# Patient Record
Sex: MALE | Race: WHITE
[De-identification: names, ages, dates, MRNs, and addresses within clinical notes are randomized per-mention and may not be internally consistent; named-entity substitution may affect disease eponyms.]

---

## 2017-11-03 ENCOUNTER — HOSPITAL ENCOUNTER (OUTPATIENT)
Age: 72
End: 2017-11-03
Payer: MEDICARE

## 2017-11-03 DIAGNOSIS — R94.5: Primary | ICD-10-CM

## 2017-11-03 DIAGNOSIS — R74.8: ICD-10-CM

## 2017-11-03 DIAGNOSIS — B19.20: ICD-10-CM

## 2017-11-03 PROCEDURE — 87902 NFCT AGT GNTYP ALYS HEP C: CPT

## 2017-11-03 PROCEDURE — 36415 COLL VENOUS BLD VENIPUNCTURE: CPT

## 2018-01-09 ENCOUNTER — HOSPITAL ENCOUNTER (OUTPATIENT)
Age: 73
End: 2018-01-09
Payer: MEDICARE

## 2018-01-09 DIAGNOSIS — B18.2: Primary | ICD-10-CM

## 2018-01-09 LAB
ALBUMIN LEVEL: 3.6 GM/DL (ref 3.4–5)
ALBUMIN/GLOB SERPL: 1.1 {RATIO} (ref 1.1–1.8)
ALP ISO SERPL-ACNC: 54 U/L (ref 46–116)
ALT SERPLBLD-CCNC: 34 U/L (ref 12–78)
ANION GAP SERPL CALC-SCNC: 14.9 MEQ/L (ref 5–15)
AST SERPL QL: 29 U/L (ref 15–37)
BILIRUBIN,TOTAL: 0.7 MG/DL (ref 0.2–1)
BUN SERPL-MCNC: 25 MG/DL (ref 7–18)
CALCIUM SPEC-MCNC: 9.5 MG/DL (ref 8.5–10.1)
CHLORIDE SPEC-SCNC: 102 MMOL/L (ref 98–107)
CO2 SERPL-SCNC: 32 MMOL/L (ref 21–32)
CREAT BLD-SCNC: 1.08 MG/DL (ref 0.7–1.3)
ESTIMATED GLOMERULAR FILT RATE: 68 ML/MIN (ref 60–?)
GFR (AFRICAN AMERICAN): 82 ML/MIN (ref 60–?)
GLOBULIN SER CALC-MCNC: 3.4 GM/DL (ref 1.3–3.2)
GLUCOSE: 180 MG/DL (ref 74–106)
HCT VFR BLD CALC: 46.4 % (ref 42–52)
HGB BLD-MCNC: 15.9 G/DL (ref 14.1–18)
MCHC RBC-ENTMCNC: 34.1 G/DL (ref 31.8–35.4)
MCV RBC: 89.4 FL (ref 80–94)
MEAN CORPUSCULAR HEMOGLOBIN: 30.5 PG (ref 27–31.2)
PLATELET # BLD: 164 K/MM3 (ref 142–424)
POTASSIUM: 3.9 MMOL/L (ref 3.5–5.1)
PROT SERPL-MCNC: 7 GM/DL (ref 6.4–8.2)
RBC # BLD AUTO: 5.2 M/MM3 (ref 4.6–6.2)
SODIUM SPEC-SCNC: 145 MMOL/L (ref 136–145)
WBC # BLD AUTO: 5.7 K/MM3 (ref 4.8–10.8)

## 2018-01-09 PROCEDURE — 85018 HEMOGLOBIN: CPT

## 2018-01-09 PROCEDURE — 85048 AUTOMATED LEUKOCYTE COUNT: CPT

## 2018-01-09 PROCEDURE — 80053 COMPREHEN METABOLIC PANEL: CPT

## 2018-01-09 PROCEDURE — 85049 AUTOMATED PLATELET COUNT: CPT

## 2018-01-09 PROCEDURE — 85014 HEMATOCRIT: CPT

## 2018-01-09 PROCEDURE — 36415 COLL VENOUS BLD VENIPUNCTURE: CPT

## 2018-02-09 ENCOUNTER — HOSPITAL ENCOUNTER (OUTPATIENT)
Age: 73
End: 2018-02-09
Payer: MEDICARE

## 2018-02-09 DIAGNOSIS — R94.5: Primary | ICD-10-CM

## 2018-02-09 LAB
ALBUMIN LEVEL: 3.6 GM/DL (ref 3.4–5)
ALBUMIN/GLOB SERPL: 1.1 {RATIO} (ref 1.1–1.8)
ALP ISO SERPL-ACNC: 66 U/L (ref 46–116)
ALT SERPLBLD-CCNC: 39 U/L (ref 12–78)
ANION GAP SERPL CALC-SCNC: 12.5 MEQ/L (ref 5–15)
AST SERPL QL: 19 U/L (ref 15–37)
BILIRUBIN,TOTAL: 0.8 MG/DL (ref 0.2–1)
BUN SERPL-MCNC: 25 MG/DL (ref 7–18)
CALCIUM SPEC-MCNC: 9.5 MG/DL (ref 8.5–10.1)
CHLORIDE SPEC-SCNC: 104 MMOL/L (ref 98–107)
CO2 SERPL-SCNC: 31 MMOL/L (ref 21–32)
CREAT BLD-SCNC: 1.03 MG/DL (ref 0.7–1.3)
ESTIMATED GLOMERULAR FILT RATE: 71 ML/MIN (ref 60–?)
GFR (AFRICAN AMERICAN): 86 ML/MIN (ref 60–?)
GLOBULIN SER CALC-MCNC: 3.4 GM/DL (ref 1.3–3.2)
GLUCOSE: 129 MG/DL (ref 74–106)
HCT VFR BLD CALC: 43.7 % (ref 42–52)
HGB BLD-MCNC: 14.8 G/DL (ref 14.1–18)
MCHC RBC-ENTMCNC: 33.9 G/DL (ref 31.8–35.4)
MCV RBC: 87.9 FL (ref 80–94)
MEAN CORPUSCULAR HEMOGLOBIN: 29.8 PG (ref 27–31.2)
PLATELET # BLD: 161 K/MM3 (ref 142–424)
POTASSIUM: 3.5 MMOL/L (ref 3.5–5.1)
PROT SERPL-MCNC: 7 GM/DL (ref 6.4–8.2)
RBC # BLD AUTO: 4.97 M/MM3 (ref 4.6–6.2)
SODIUM SPEC-SCNC: 144 MMOL/L (ref 136–145)
WBC # BLD AUTO: 9.2 K/MM3 (ref 4.8–10.8)

## 2018-02-09 PROCEDURE — 87522 HEPATITIS C REVRS TRNSCRPJ: CPT

## 2018-02-09 PROCEDURE — 85025 COMPLETE CBC W/AUTO DIFF WBC: CPT

## 2018-02-09 PROCEDURE — 80053 COMPREHEN METABOLIC PANEL: CPT

## 2018-02-09 PROCEDURE — 36415 COLL VENOUS BLD VENIPUNCTURE: CPT

## 2018-03-12 ENCOUNTER — HOSPITAL ENCOUNTER (OUTPATIENT)
Age: 73
End: 2018-03-12
Payer: MEDICARE

## 2018-03-12 DIAGNOSIS — K80.50: ICD-10-CM

## 2018-03-12 DIAGNOSIS — R10.11: ICD-10-CM

## 2018-03-12 DIAGNOSIS — R94.5: Primary | ICD-10-CM

## 2018-03-12 LAB
ALBUMIN LEVEL: 3.6 GM/DL (ref 3.4–5)
ALBUMIN/GLOB SERPL: 1.1 {RATIO} (ref 1.1–1.8)
ALP ISO SERPL-ACNC: 61 U/L (ref 46–116)
ALT SERPLBLD-CCNC: 28 U/L (ref 12–78)
ANION GAP SERPL CALC-SCNC: 12.1 MEQ/L (ref 5–15)
AST SERPL QL: 13 U/L (ref 15–37)
BILIRUBIN,TOTAL: 1.1 MG/DL (ref 0.2–1)
BUN SERPL-MCNC: 24 MG/DL (ref 7–18)
CALCIUM SPEC-MCNC: 8.9 MG/DL (ref 8.5–10.1)
CHLORIDE SPEC-SCNC: 103 MMOL/L (ref 98–107)
CO2 SERPL-SCNC: 30 MMOL/L (ref 21–32)
CREAT BLD-SCNC: 1.13 MG/DL (ref 0.7–1.3)
ESTIMATED GLOMERULAR FILT RATE: 64 ML/MIN (ref 60–?)
GFR (AFRICAN AMERICAN): 78 ML/MIN (ref 60–?)
GLOBULIN SER CALC-MCNC: 3.3 GM/DL (ref 1.3–3.2)
GLUCOSE: 142 MG/DL (ref 74–106)
HCT VFR BLD CALC: 41.5 % (ref 42–52)
HGB BLD-MCNC: 13.8 G/DL (ref 14.1–18)
MCHC RBC-ENTMCNC: 33.3 G/DL (ref 31.8–35.4)
MCV RBC: 90.2 FL (ref 80–94)
MEAN CORPUSCULAR HEMOGLOBIN: 30 PG (ref 27–31.2)
PLATELET # BLD: 159 K/MM3 (ref 142–424)
POTASSIUM: 3.1 MMOL/L (ref 3.5–5.1)
PROT SERPL-MCNC: 6.9 GM/DL (ref 6.4–8.2)
RBC # BLD AUTO: 4.6 M/MM3 (ref 4.6–6.2)
SODIUM SPEC-SCNC: 142 MMOL/L (ref 136–145)
WBC # BLD AUTO: 7.1 K/MM3 (ref 4.8–10.8)

## 2018-03-12 PROCEDURE — 36415 COLL VENOUS BLD VENIPUNCTURE: CPT

## 2018-03-12 PROCEDURE — 87522 HEPATITIS C REVRS TRNSCRPJ: CPT

## 2018-03-12 PROCEDURE — 80053 COMPREHEN METABOLIC PANEL: CPT

## 2018-03-12 PROCEDURE — 85025 COMPLETE CBC W/AUTO DIFF WBC: CPT

## 2018-03-28 ENCOUNTER — HOSPITAL ENCOUNTER (OUTPATIENT)
Facility: HOSPITAL | Age: 73
Setting detail: HOSPITAL OUTPATIENT SURGERY
Discharge: HOME | End: 2018-03-28
Attending: INTERNAL MEDICINE | Admitting: INTERNAL MEDICINE

## 2018-03-28 VITALS
BODY MASS INDEX: 33.88 KG/M2 | TEMPERATURE: 97.4 F | HEART RATE: 98 BPM | WEIGHT: 264 LBS | HEIGHT: 74 IN | RESPIRATION RATE: 16 BRPM | OXYGEN SATURATION: 99 %

## 2018-03-28 LAB — POTASSIUM BLDA-SCNC: 2.98 MMOL/L (ref 3.5–5.3)

## 2018-03-28 PROCEDURE — 84132 ASSAY OF SERUM POTASSIUM: CPT | Performed by: ANESTHESIOLOGY

## 2018-03-28 PROCEDURE — 93005 ELECTROCARDIOGRAM TRACING: CPT | Performed by: ANESTHESIOLOGY

## 2018-03-28 PROCEDURE — 93010 ELECTROCARDIOGRAM REPORT: CPT | Performed by: INTERNAL MEDICINE

## 2018-03-28 RX ORDER — FAMOTIDINE 20 MG/1
20 TABLET, FILM COATED ORAL ONCE
Status: CANCELLED | OUTPATIENT
Start: 2018-03-28 | End: 2018-03-28

## 2018-03-28 RX ORDER — SODIUM CHLORIDE 0.9 % (FLUSH) 0.9 %
1-10 SYRINGE (ML) INJECTION AS NEEDED
Status: DISCONTINUED | OUTPATIENT
Start: 2018-03-28 | End: 2018-03-28 | Stop reason: HOSPADM

## 2018-03-28 RX ORDER — LIDOCAINE HYDROCHLORIDE 10 MG/ML
0.5 INJECTION, SOLUTION EPIDURAL; INFILTRATION; INTRACAUDAL; PERINEURAL ONCE AS NEEDED
Status: CANCELLED | OUTPATIENT
Start: 2018-03-28

## 2018-03-28 RX ORDER — PANTOPRAZOLE SODIUM 40 MG/1
40 TABLET, DELAYED RELEASE ORAL ONCE
Status: DISCONTINUED | OUTPATIENT
Start: 2018-03-28 | End: 2018-03-28 | Stop reason: HOSPADM

## 2018-03-28 RX ORDER — FINASTERIDE 5 MG/1
5 TABLET, FILM COATED ORAL DAILY
COMMUNITY

## 2018-03-28 RX ORDER — SODIUM CHLORIDE, SODIUM LACTATE, POTASSIUM CHLORIDE, CALCIUM CHLORIDE 600; 310; 30; 20 MG/100ML; MG/100ML; MG/100ML; MG/100ML
9 INJECTION, SOLUTION INTRAVENOUS CONTINUOUS
Status: CANCELLED | OUTPATIENT
Start: 2018-03-28

## 2018-03-28 RX ORDER — OMEPRAZOLE 20 MG/1
20 CAPSULE, DELAYED RELEASE ORAL
COMMUNITY

## 2018-03-28 RX ORDER — LOSARTAN POTASSIUM AND HYDROCHLOROTHIAZIDE 12.5; 5 MG/1; MG/1
1 TABLET ORAL DAILY
COMMUNITY

## 2018-03-28 RX ORDER — CARVEDILOL 25 MG/1
25 TABLET ORAL 2 TIMES DAILY WITH MEALS
COMMUNITY

## 2018-03-28 RX ORDER — SODIUM CHLORIDE, SODIUM LACTATE, POTASSIUM CHLORIDE, CALCIUM CHLORIDE 600; 310; 30; 20 MG/100ML; MG/100ML; MG/100ML; MG/100ML
20 INJECTION, SOLUTION INTRAVENOUS CONTINUOUS
Status: DISCONTINUED | OUTPATIENT
Start: 2018-03-28 | End: 2018-03-28 | Stop reason: HOSPADM

## 2018-03-28 RX ORDER — PANTOPRAZOLE SODIUM 40 MG/10ML
40 INJECTION, POWDER, LYOPHILIZED, FOR SOLUTION INTRAVENOUS ONCE
Status: DISCONTINUED | OUTPATIENT
Start: 2018-03-28 | End: 2018-03-28 | Stop reason: HOSPADM

## 2018-03-28 NOTE — NURSING NOTE
Notified Dr. Guaman (Anesthesia) regarding EKG showing atrial fibrillation with rapid ventricular rate. Dr. Pa recommends patient see Dr. Seymour at  (Cardiology) prior to proceeding with ERCP and stent removal. Case not performed.

## 2018-04-09 ENCOUNTER — HOSPITAL ENCOUNTER (OUTPATIENT)
Age: 73
End: 2018-04-09
Payer: MEDICARE

## 2018-04-09 DIAGNOSIS — R94.6: Primary | ICD-10-CM

## 2018-04-09 LAB
ALBUMIN LEVEL: 3.8 GM/DL (ref 3.4–5)
ALBUMIN/GLOB SERPL: 1.1 {RATIO} (ref 1.1–1.8)
ALP ISO SERPL-ACNC: 61 U/L (ref 46–116)
ALT SERPLBLD-CCNC: 28 U/L (ref 12–78)
ANION GAP SERPL CALC-SCNC: 14.1 MEQ/L (ref 5–15)
AST SERPL QL: 20 U/L (ref 15–37)
BILIRUBIN,TOTAL: 1 MG/DL (ref 0.2–1)
BUN SERPL-MCNC: 25 MG/DL (ref 7–18)
CALCIUM SPEC-MCNC: 9 MG/DL (ref 8.5–10.1)
CHLORIDE SPEC-SCNC: 102 MMOL/L (ref 98–107)
CO2 SERPL-SCNC: 29 MMOL/L (ref 21–32)
CREAT BLD-SCNC: 1.06 MG/DL (ref 0.7–1.3)
ESTIMATED GLOMERULAR FILT RATE: 69 ML/MIN (ref 60–?)
GFR (AFRICAN AMERICAN): 84 ML/MIN (ref 60–?)
GLOBULIN SER CALC-MCNC: 3.5 GM/DL (ref 1.3–3.2)
GLUCOSE: 149 MG/DL (ref 74–106)
HCT VFR BLD CALC: 42.6 % (ref 42–52)
HGB BLD-MCNC: 14.4 G/DL (ref 14.1–18)
MCHC RBC-ENTMCNC: 33.8 G/DL (ref 31.8–35.4)
MCV RBC: 90.6 FL (ref 80–94)
MEAN CORPUSCULAR HEMOGLOBIN: 30.6 PG (ref 27–31.2)
PLATELET # BLD: 166 K/MM3 (ref 142–424)
POTASSIUM: 3.1 MMOL/L (ref 3.5–5.1)
PROT SERPL-MCNC: 7.3 GM/DL (ref 6.4–8.2)
RBC # BLD AUTO: 4.7 M/MM3 (ref 4.6–6.2)
SODIUM SPEC-SCNC: 142 MMOL/L (ref 136–145)
WBC # BLD AUTO: 6.9 K/MM3 (ref 4.8–10.8)

## 2018-04-09 PROCEDURE — 80053 COMPREHEN METABOLIC PANEL: CPT

## 2018-04-09 PROCEDURE — 36415 COLL VENOUS BLD VENIPUNCTURE: CPT

## 2018-04-09 PROCEDURE — 85025 COMPLETE CBC W/AUTO DIFF WBC: CPT

## 2018-05-31 ENCOUNTER — HOSPITAL ENCOUNTER (OUTPATIENT)
Age: 73
End: 2018-05-31
Payer: MEDICARE

## 2018-05-31 DIAGNOSIS — R94.5: Primary | ICD-10-CM

## 2018-05-31 LAB
ALBUMIN LEVEL: 3.7 GM/DL (ref 3.4–5)
ALBUMIN/GLOB SERPL: 1.2 {RATIO} (ref 1.1–1.8)
ALP ISO SERPL-ACNC: 51 U/L (ref 46–116)
ALT SERPLBLD-CCNC: 25 U/L (ref 12–78)
ANION GAP SERPL CALC-SCNC: 13.1 MEQ/L (ref 5–15)
AST SERPL QL: 19 U/L (ref 15–37)
BILIRUBIN,TOTAL: 1.1 MG/DL (ref 0.2–1)
BUN SERPL-MCNC: 22 MG/DL (ref 7–18)
CALCIUM SPEC-MCNC: 8.8 MG/DL (ref 8.5–10.1)
CHLORIDE SPEC-SCNC: 103 MMOL/L (ref 98–107)
CO2 SERPL-SCNC: 29 MMOL/L (ref 21–32)
CREAT BLD-SCNC: 1.06 MG/DL (ref 0.7–1.3)
ESTIMATED GLOMERULAR FILT RATE: 69 ML/MIN (ref 60–?)
GFR (AFRICAN AMERICAN): 84 ML/MIN (ref 60–?)
GLOBULIN SER CALC-MCNC: 3.1 GM/DL (ref 1.3–3.2)
GLUCOSE: 135 MG/DL (ref 74–106)
HCT VFR BLD CALC: 41 % (ref 42–52)
HGB BLD-MCNC: 13.9 G/DL (ref 14.1–18)
MCHC RBC-ENTMCNC: 33.9 G/DL (ref 31.8–35.4)
MCV RBC: 87.8 FL (ref 80–94)
MEAN CORPUSCULAR HEMOGLOBIN: 29.8 PG (ref 27–31.2)
PLATELET # BLD: 149 K/MM3 (ref 142–424)
POTASSIUM: 3.1 MMOL/L (ref 3.5–5.1)
PROT SERPL-MCNC: 6.8 GM/DL (ref 6.4–8.2)
RBC # BLD AUTO: 4.66 M/MM3 (ref 4.6–6.2)
SODIUM SPEC-SCNC: 142 MMOL/L (ref 136–145)
WBC # BLD AUTO: 6.1 K/MM3 (ref 4.8–10.8)

## 2018-05-31 PROCEDURE — 85014 HEMATOCRIT: CPT

## 2018-05-31 PROCEDURE — 85018 HEMOGLOBIN: CPT

## 2018-05-31 PROCEDURE — 36415 COLL VENOUS BLD VENIPUNCTURE: CPT

## 2018-05-31 PROCEDURE — 85049 AUTOMATED PLATELET COUNT: CPT

## 2018-05-31 PROCEDURE — 80053 COMPREHEN METABOLIC PANEL: CPT

## 2018-05-31 PROCEDURE — 85048 AUTOMATED LEUKOCYTE COUNT: CPT

## 2018-07-02 ENCOUNTER — HOSPITAL ENCOUNTER (OUTPATIENT)
Age: 73
End: 2018-07-02
Payer: MEDICARE

## 2018-07-02 DIAGNOSIS — R94.5: Primary | ICD-10-CM

## 2018-07-02 LAB
ALBUMIN LEVEL: 3.8 GM/DL (ref 3.4–5)
ALBUMIN/GLOB SERPL: 1.2 {RATIO} (ref 1.1–1.8)
ALP ISO SERPL-ACNC: 49 U/L (ref 46–116)
ALT SERPLBLD-CCNC: 21 U/L (ref 12–78)
ANION GAP SERPL CALC-SCNC: 15.1 MEQ/L (ref 5–15)
AST SERPL QL: 17 U/L (ref 15–37)
BILIRUBIN,TOTAL: 1 MG/DL (ref 0.2–1)
BUN SERPL-MCNC: 29 MG/DL (ref 7–18)
CALCIUM SPEC-MCNC: 8.6 MG/DL (ref 8.5–10.1)
CHLORIDE SPEC-SCNC: 103 MMOL/L (ref 98–107)
CO2 SERPL-SCNC: 25 MMOL/L (ref 21–32)
CREAT BLD-SCNC: 1.4 MG/DL (ref 0.7–1.3)
ESTIMATED GLOMERULAR FILT RATE: 50 ML/MIN (ref 60–?)
GFR (AFRICAN AMERICAN): 61 ML/MIN (ref 60–?)
GLOBULIN SER CALC-MCNC: 3.1 GM/DL (ref 1.3–3.2)
GLUCOSE: 133 MG/DL (ref 74–106)
HCT VFR BLD CALC: 40.1 % (ref 42–52)
HGB BLD-MCNC: 13.5 G/DL (ref 14.1–18)
MCHC RBC-ENTMCNC: 33.8 G/DL (ref 31.8–35.4)
MCV RBC: 87.5 FL (ref 80–94)
MEAN CORPUSCULAR HEMOGLOBIN: 29.5 PG (ref 27–31.2)
PLATELET # BLD: 204 K/MM3 (ref 142–424)
POTASSIUM: 3.1 MMOL/L (ref 3.5–5.1)
PROT SERPL-MCNC: 6.9 GM/DL (ref 6.4–8.2)
RBC # BLD AUTO: 4.58 M/MM3 (ref 4.6–6.2)
SODIUM SPEC-SCNC: 140 MMOL/L (ref 136–145)
WBC # BLD AUTO: 7 K/MM3 (ref 4.8–10.8)

## 2018-07-02 PROCEDURE — 36415 COLL VENOUS BLD VENIPUNCTURE: CPT

## 2018-07-02 PROCEDURE — 85049 AUTOMATED PLATELET COUNT: CPT

## 2018-07-02 PROCEDURE — 85048 AUTOMATED LEUKOCYTE COUNT: CPT

## 2018-07-02 PROCEDURE — 80053 COMPREHEN METABOLIC PANEL: CPT

## 2018-07-02 PROCEDURE — 85018 HEMOGLOBIN: CPT

## 2018-07-02 PROCEDURE — 85014 HEMATOCRIT: CPT

## 2018-08-23 ENCOUNTER — HOSPITAL ENCOUNTER (OUTPATIENT)
Age: 73
End: 2018-08-23
Payer: MEDICARE

## 2018-08-23 DIAGNOSIS — R97.20: Primary | ICD-10-CM

## 2018-08-23 LAB — PROSTATE SPECIFIC AG, DIAGNOST: 2.67 NG/ML (ref 0–4)

## 2018-08-23 PROCEDURE — 84153 ASSAY OF PSA TOTAL: CPT

## 2018-08-23 PROCEDURE — 36415 COLL VENOUS BLD VENIPUNCTURE: CPT

## 2018-09-10 ENCOUNTER — ON CAMPUS - OUTPATIENT (OUTPATIENT)
Dept: RURAL HOSPITAL 6 | Facility: HOSPITAL | Age: 73
End: 2018-09-10

## 2018-09-10 DIAGNOSIS — K80.51 CALCULUS OF BILE DUCT WITHOUT CHOLANGITIS OR CHOLECYSTITIS W: ICD-10-CM

## 2018-09-10 DIAGNOSIS — Z96.89 PRESENCE OF OTHER SPECIFIED FUNCTIONAL IMPLANTS: ICD-10-CM

## 2018-09-10 PROCEDURE — 43276 ERCP STENT EXCHANGE W/DILATE: CPT | Performed by: INTERNAL MEDICINE

## 2018-09-10 PROCEDURE — 43264 ERCP REMOVE DUCT CALCULI: CPT | Performed by: INTERNAL MEDICINE

## 2018-10-10 ENCOUNTER — HOSPITAL ENCOUNTER (OUTPATIENT)
Age: 73
End: 2018-10-10
Payer: MEDICARE

## 2018-10-10 DIAGNOSIS — R94.5: ICD-10-CM

## 2018-10-10 DIAGNOSIS — E78.5: ICD-10-CM

## 2018-10-10 DIAGNOSIS — Z12.5: ICD-10-CM

## 2018-10-10 DIAGNOSIS — E11.9: ICD-10-CM

## 2018-10-10 DIAGNOSIS — I48.91: Primary | ICD-10-CM

## 2018-10-10 LAB
ALBUMIN LEVEL: 3.9 GM/DL (ref 3.4–5)
ALBUMIN/GLOB SERPL: 1.1 {RATIO} (ref 1.1–1.8)
ALP ISO SERPL-ACNC: 54 U/L (ref 46–116)
ALT SERPLBLD-CCNC: 22 U/L (ref 12–78)
ANION GAP SERPL CALC-SCNC: 13.5 MEQ/L (ref 5–15)
AST SERPL QL: 18 U/L (ref 15–37)
BILIRUBIN,TOTAL: 0.9 MG/DL (ref 0.2–1)
BUN SERPL-MCNC: 25 MG/DL (ref 7–18)
CALCIUM SPEC-MCNC: 9.1 MG/DL (ref 8.5–10.1)
CHLORIDE SPEC-SCNC: 102 MMOL/L (ref 98–107)
CHOLEST SPEC-SCNC: 155 MG/DL (ref 140–200)
CO2 SERPL-SCNC: 28 MMOL/L (ref 21–32)
CREAT BLD-SCNC: 1.3 MG/DL (ref 0.7–1.3)
ESTIMATED GLOMERULAR FILT RATE: 54 ML/MIN (ref 60–?)
FT4I SERPL CALC-MCNC: 2.4 UG/DL (ref 5.93–13.13)
GFR (AFRICAN AMERICAN): 66 ML/MIN (ref 60–?)
GLOBULIN SER CALC-MCNC: 3.4 GM/DL (ref 1.3–3.2)
GLUCOSE: 132 MG/DL (ref 74–106)
HBA1C MFR BLD: 5.5 % (ref 0–7)
HCT VFR BLD CALC: 39.3 % (ref 42–52)
HDLC SERPL-MCNC: 34 MG/DL (ref 27–67)
HGB BLD-MCNC: 13.5 G/DL (ref 14.1–18)
MCHC RBC-ENTMCNC: 34.4 G/DL (ref 31.8–35.4)
MCV RBC: 87.8 FL (ref 80–94)
MEAN CORPUSCULAR HEMOGLOBIN: 30.2 PG (ref 27–31.2)
PLATELET # BLD: 203 K/MM3 (ref 142–424)
POTASSIUM: 3.5 MMOL/L (ref 3.5–5.1)
PROT SERPL-MCNC: 7.3 GM/DL (ref 6.4–8.2)
RBC # BLD AUTO: 4.48 M/MM3 (ref 4.6–6.2)
SODIUM SPEC-SCNC: 140 MMOL/L (ref 136–145)
T3RU NFR SERPL: 36 % (ref 31–39)
T4 (THYROXINE): 6.7 UG/DL (ref 4.7–13.3)
TRIGLYCERIDES: 177 MG/DL (ref 30–200)
TSH SERPL-ACNC: 2.59 UIU/ML (ref 0.36–3.74)
WBC # BLD AUTO: 5.9 K/MM3 (ref 4.8–10.8)

## 2018-10-10 PROCEDURE — 85025 COMPLETE CBC W/AUTO DIFF WBC: CPT

## 2018-10-10 PROCEDURE — 83036 HEMOGLOBIN GLYCOSYLATED A1C: CPT

## 2018-10-10 PROCEDURE — 84443 ASSAY THYROID STIM HORMONE: CPT

## 2018-10-10 PROCEDURE — 84479 ASSAY OF THYROID (T3 OR T4): CPT

## 2018-10-10 PROCEDURE — 36415 COLL VENOUS BLD VENIPUNCTURE: CPT

## 2018-10-10 PROCEDURE — 84436 ASSAY OF TOTAL THYROXINE: CPT

## 2018-10-10 PROCEDURE — 80053 COMPREHEN METABOLIC PANEL: CPT

## 2018-10-10 PROCEDURE — G0103 PSA SCREENING: HCPCS

## 2018-10-10 PROCEDURE — 80061 LIPID PANEL: CPT

## 2019-01-21 ENCOUNTER — HOSPITAL ENCOUNTER (OUTPATIENT)
Age: 74
End: 2019-01-21
Payer: MEDICARE

## 2019-01-21 DIAGNOSIS — I10: ICD-10-CM

## 2019-01-21 DIAGNOSIS — I48.91: ICD-10-CM

## 2019-01-21 DIAGNOSIS — R42: Primary | ICD-10-CM

## 2019-01-21 DIAGNOSIS — R06.02: ICD-10-CM

## 2019-01-21 PROCEDURE — 93880 EXTRACRANIAL BILAT STUDY: CPT

## 2019-01-21 PROCEDURE — 93306 TTE W/DOPPLER COMPLETE: CPT

## 2019-03-11 ENCOUNTER — ON CAMPUS - OUTPATIENT (OUTPATIENT)
Dept: RURAL HOSPITAL 6 | Facility: HOSPITAL | Age: 74
End: 2019-03-11

## 2019-03-11 DIAGNOSIS — K80.50 CALCULUS OF BILE DUCT WITHOUT CHOLANGITIS OR CHOLECYSTITIS W: ICD-10-CM

## 2019-03-11 PROCEDURE — 43264 ERCP REMOVE DUCT CALCULI: CPT | Performed by: INTERNAL MEDICINE

## 2019-03-11 PROCEDURE — 43262 ENDO CHOLANGIOPANCREATOGRAPH: CPT | Performed by: INTERNAL MEDICINE

## 2019-08-29 ENCOUNTER — HOSPITAL ENCOUNTER (OUTPATIENT)
Age: 74
End: 2019-08-29
Payer: MEDICARE

## 2019-08-29 DIAGNOSIS — Z12.5: Primary | ICD-10-CM

## 2019-08-29 LAB — PROSTATE SPECIFIC AG, DIAGNOST: 1.92 NG/ML (ref 0–4)

## 2019-08-29 PROCEDURE — 36415 COLL VENOUS BLD VENIPUNCTURE: CPT

## 2019-08-29 PROCEDURE — 84153 ASSAY OF PSA TOTAL: CPT

## 2019-09-26 ENCOUNTER — HOSPITAL ENCOUNTER (OUTPATIENT)
Age: 74
End: 2019-09-26
Payer: MEDICARE

## 2019-09-26 DIAGNOSIS — E11.9: Primary | ICD-10-CM

## 2019-09-26 DIAGNOSIS — I10: ICD-10-CM

## 2019-09-26 DIAGNOSIS — I48.91: ICD-10-CM

## 2019-09-26 LAB
ALBUMIN LEVEL: 4 GM/DL (ref 3.4–5)
ALBUMIN/GLOB SERPL: 1.1 {RATIO} (ref 1.1–1.8)
ALP ISO SERPL-ACNC: 60 U/L (ref 46–116)
ALT SERPLBLD-CCNC: 17 U/L (ref 12–78)
ANION GAP SERPL CALC-SCNC: 13.3 MEQ/L (ref 5–15)
AST SERPL QL: 15 U/L (ref 15–37)
BILIRUBIN,TOTAL: 0.9 MG/DL (ref 0.2–1)
BUN SERPL-MCNC: 29 MG/DL (ref 7–18)
CALCIUM SPEC-MCNC: 9.7 MG/DL (ref 8.5–10.1)
CHLORIDE SPEC-SCNC: 99 MMOL/L (ref 98–107)
CHOLEST SPEC-SCNC: 172 MG/DL (ref 140–200)
CO2 SERPL-SCNC: 29 MMOL/L (ref 21–32)
CREAT BLD-SCNC: 1.32 MG/DL (ref 0.7–1.3)
ESTIMATED GLOMERULAR FILT RATE: 53 ML/MIN (ref 60–?)
GFR (AFRICAN AMERICAN): 65 ML/MIN (ref 60–?)
GLOBULIN SER CALC-MCNC: 3.5 GM/DL (ref 1.3–3.2)
GLUCOSE: 144 MG/DL (ref 74–106)
HBA1C MFR BLD: 6 % (ref 0–7)
HCT VFR BLD CALC: 40.3 % (ref 42–52)
HDLC SERPL-MCNC: 35 MG/DL (ref 27–67)
HGB BLD-MCNC: 13 G/DL (ref 14.1–18)
MCHC RBC-ENTMCNC: 32.2 G/DL (ref 31.8–35.4)
MCV RBC: 91.5 FL (ref 80–94)
MEAN CORPUSCULAR HEMOGLOBIN: 29.4 PG (ref 27–31.2)
PLATELET # BLD: 197 K/MM3 (ref 142–424)
POTASSIUM: 3.3 MMOL/L (ref 3.5–5.1)
PROT SERPL-MCNC: 7.5 GM/DL (ref 6.4–8.2)
RBC # BLD AUTO: 4.4 M/MM3 (ref 4.6–6.2)
SODIUM SPEC-SCNC: 138 MMOL/L (ref 136–145)
TRIGLYCERIDES: 189 MG/DL (ref 30–200)
TSH SERPL-ACNC: 2.97 UIU/ML (ref 0.36–3.74)
WBC # BLD AUTO: 5.6 K/MM3 (ref 4.8–10.8)

## 2019-09-26 PROCEDURE — 36415 COLL VENOUS BLD VENIPUNCTURE: CPT

## 2019-09-26 PROCEDURE — 84443 ASSAY THYROID STIM HORMONE: CPT

## 2019-09-26 PROCEDURE — 85025 COMPLETE CBC W/AUTO DIFF WBC: CPT

## 2019-09-26 PROCEDURE — 83036 HEMOGLOBIN GLYCOSYLATED A1C: CPT

## 2019-09-26 PROCEDURE — 80061 LIPID PANEL: CPT

## 2019-09-26 PROCEDURE — 80053 COMPREHEN METABOLIC PANEL: CPT

## 2020-01-14 ENCOUNTER — HOSPITAL ENCOUNTER (OUTPATIENT)
Age: 75
End: 2020-01-14
Payer: MEDICARE

## 2020-01-14 DIAGNOSIS — E78.5: ICD-10-CM

## 2020-01-14 DIAGNOSIS — I10: ICD-10-CM

## 2020-01-14 DIAGNOSIS — E11.9: Primary | ICD-10-CM

## 2020-01-14 DIAGNOSIS — N40.0: ICD-10-CM

## 2020-01-14 DIAGNOSIS — Z12.5: ICD-10-CM

## 2020-01-14 LAB
ALBUMIN LEVEL: 4 GM/DL (ref 3.4–5)
ALBUMIN/GLOB SERPL: 1.3 {RATIO} (ref 1.1–1.8)
ALP ISO SERPL-ACNC: 71 U/L (ref 46–116)
ALT SERPLBLD-CCNC: 21 U/L (ref 12–78)
ANION GAP SERPL CALC-SCNC: 14.5 MEQ/L (ref 5–15)
AST SERPL QL: 18 U/L (ref 15–37)
BILIRUBIN,TOTAL: 0.8 MG/DL (ref 0.2–1)
BUN SERPL-MCNC: 25 MG/DL (ref 7–18)
CALCIUM SPEC-MCNC: 9.1 MG/DL (ref 8.5–10.1)
CHLORIDE SPEC-SCNC: 100 MMOL/L (ref 98–107)
CHOLEST SPEC-SCNC: 166 MG/DL (ref 140–200)
CO2 SERPL-SCNC: 27 MMOL/L (ref 21–32)
CREAT BLD-SCNC: 1.11 MG/DL (ref 0.7–1.3)
ESTIMATED GLOMERULAR FILT RATE: 65 ML/MIN (ref 60–?)
GFR (AFRICAN AMERICAN): 79 ML/MIN (ref 60–?)
GLOBULIN SER CALC-MCNC: 3.2 GM/DL (ref 1.3–3.2)
GLUCOSE: 136 MG/DL (ref 74–106)
HBA1C MFR BLD: 6.2 % (ref 0–7)
HCT VFR BLD CALC: 45.4 % (ref 42–52)
HDLC SERPL-MCNC: 38 MG/DL (ref 27–67)
HGB BLD-MCNC: 15.1 G/DL (ref 14.1–18)
MCHC RBC-ENTMCNC: 33.3 G/DL (ref 31.8–35.4)
MCV RBC: 87.5 FL (ref 80–94)
MEAN CORPUSCULAR HEMOGLOBIN: 29.1 PG (ref 27–31.2)
PLATELET # BLD: 163 K/MM3 (ref 142–424)
POTASSIUM: 3.5 MMOL/L (ref 3.5–5.1)
PROT SERPL-MCNC: 7.2 GM/DL (ref 6.4–8.2)
RBC # BLD AUTO: 5.19 M/MM3 (ref 4.6–6.2)
SODIUM SPEC-SCNC: 138 MMOL/L (ref 136–145)
TRIGLYCERIDES: 121 MG/DL (ref 30–200)
WBC # BLD AUTO: 5 K/MM3 (ref 4.8–10.8)

## 2020-01-14 PROCEDURE — 85025 COMPLETE CBC W/AUTO DIFF WBC: CPT

## 2020-01-14 PROCEDURE — 36415 COLL VENOUS BLD VENIPUNCTURE: CPT

## 2020-01-14 PROCEDURE — G0103 PSA SCREENING: HCPCS

## 2020-01-14 PROCEDURE — 83036 HEMOGLOBIN GLYCOSYLATED A1C: CPT

## 2020-01-14 PROCEDURE — 80061 LIPID PANEL: CPT

## 2020-01-14 PROCEDURE — 80053 COMPREHEN METABOLIC PANEL: CPT

## 2020-08-27 ENCOUNTER — HOSPITAL ENCOUNTER (OUTPATIENT)
Age: 75
End: 2020-08-27
Payer: MEDICARE

## 2020-08-27 DIAGNOSIS — N40.1: Primary | ICD-10-CM

## 2020-08-27 LAB — PROSTATE SPECIFIC AG, DIAGNOST: 1.53 NG/ML (ref 0–4)

## 2020-08-27 PROCEDURE — 36415 COLL VENOUS BLD VENIPUNCTURE: CPT

## 2020-08-27 PROCEDURE — 84153 ASSAY OF PSA TOTAL: CPT

## 2020-12-15 ENCOUNTER — HOSPITAL ENCOUNTER (OUTPATIENT)
Age: 75
End: 2020-12-15
Payer: MEDICARE

## 2020-12-15 DIAGNOSIS — R79.89: Primary | ICD-10-CM

## 2020-12-15 LAB
ANION GAP SERPL CALC-SCNC: 12.7 MEQ/L (ref 5–15)
BUN SERPL-MCNC: 35 MG/DL (ref 9–20)
CALCIUM SPEC-MCNC: 9.9 MG/DL (ref 8.4–10.2)
CHLORIDE SPEC-SCNC: 100 MMOL/L (ref 98–107)
CO2 SERPL-SCNC: 30 MMOL/L (ref 22–30)
CREAT BLD-SCNC: 1.3 MG/DL (ref 0.66–1.25)
ESTIMATED GLOMERULAR FILT RATE: 54 ML/MIN (ref 60–?)
GFR (AFRICAN AMERICAN): 65 ML/MIN (ref 60–?)
GLUCOSE: 128 MG/DL (ref 74–100)
POTASSIUM: 3.7 MMOL/L (ref 3.5–5.1)
SODIUM SPEC-SCNC: 139 MMOL/L (ref 136–145)

## 2020-12-15 PROCEDURE — 80048 BASIC METABOLIC PNL TOTAL CA: CPT

## 2020-12-15 PROCEDURE — 36415 COLL VENOUS BLD VENIPUNCTURE: CPT

## 2021-08-27 ENCOUNTER — HOSPITAL ENCOUNTER (OUTPATIENT)
Age: 76
End: 2021-08-27
Payer: MEDICARE

## 2021-08-27 DIAGNOSIS — N13.8: Primary | ICD-10-CM

## 2021-08-27 DIAGNOSIS — N40.1: ICD-10-CM

## 2021-08-27 PROCEDURE — 84154 ASSAY OF PSA FREE: CPT

## 2021-08-27 PROCEDURE — 84153 ASSAY OF PSA TOTAL: CPT

## 2021-08-27 PROCEDURE — 36415 COLL VENOUS BLD VENIPUNCTURE: CPT

## 2021-08-29 LAB — PSA, FREE: 0.38 NG/ML

## 2021-09-22 ENCOUNTER — HOSPITAL ENCOUNTER (OUTPATIENT)
Age: 76
End: 2021-09-22
Payer: MEDICARE

## 2021-09-22 DIAGNOSIS — Z20.822: ICD-10-CM

## 2021-09-22 DIAGNOSIS — Z01.812: Primary | ICD-10-CM

## 2021-09-22 DIAGNOSIS — Z12.11: ICD-10-CM

## 2021-09-22 PROCEDURE — C9803 HOPD COVID-19 SPEC COLLECT: HCPCS

## 2021-09-22 PROCEDURE — U0003 INFECTIOUS AGENT DETECTION BY NUCLEIC ACID (DNA OR RNA); SEVERE ACUTE RESPIRATORY SYNDROME CORONAVIRUS 2 (SARS-COV-2) (CORONAVIRUS DISEASE [COVID-19]), AMPLIFIED PROBE TECHNIQUE, MAKING USE OF HIGH THROUGHPUT TECHNOLOGIES AS DESCRIBED BY CMS-2020-01-R: HCPCS

## 2021-09-22 PROCEDURE — U0005 INFEC AGEN DETEC AMPLI PROBE: HCPCS

## 2021-09-24 ENCOUNTER — HOSPITAL ENCOUNTER (OUTPATIENT)
Dept: HOSPITAL 22 - OUTP | Age: 76
Discharge: HOME | End: 2021-09-24
Payer: MEDICARE

## 2021-09-24 ENCOUNTER — ON CAMPUS - OUTPATIENT (OUTPATIENT)
Dept: RURAL HOSPITAL 6 | Facility: HOSPITAL | Age: 76
End: 2021-09-24
Payer: COMMERCIAL

## 2021-09-24 VITALS
SYSTOLIC BLOOD PRESSURE: 142 MMHG | DIASTOLIC BLOOD PRESSURE: 103 MMHG | OXYGEN SATURATION: 97 % | TEMPERATURE: 98.3 F | RESPIRATION RATE: 18 BRPM | HEART RATE: 80 BPM

## 2021-09-24 VITALS
TEMPERATURE: 97.3 F | SYSTOLIC BLOOD PRESSURE: 116 MMHG | DIASTOLIC BLOOD PRESSURE: 85 MMHG | RESPIRATION RATE: 18 BRPM | HEART RATE: 78 BPM | OXYGEN SATURATION: 96 %

## 2021-09-24 VITALS
RESPIRATION RATE: 18 BRPM | HEART RATE: 91 BPM | TEMPERATURE: 97.3 F | DIASTOLIC BLOOD PRESSURE: 78 MMHG | OXYGEN SATURATION: 90 % | SYSTOLIC BLOOD PRESSURE: 121 MMHG

## 2021-09-24 VITALS
TEMPERATURE: 97.3 F | HEART RATE: 78 BPM | DIASTOLIC BLOOD PRESSURE: 75 MMHG | RESPIRATION RATE: 18 BRPM | OXYGEN SATURATION: 96 % | SYSTOLIC BLOOD PRESSURE: 118 MMHG

## 2021-09-24 VITALS
HEART RATE: 81 BPM | OXYGEN SATURATION: 95 % | DIASTOLIC BLOOD PRESSURE: 76 MMHG | TEMPERATURE: 97.3 F | SYSTOLIC BLOOD PRESSURE: 114 MMHG | RESPIRATION RATE: 18 BRPM

## 2021-09-24 VITALS — BODY MASS INDEX: 35.9 KG/M2

## 2021-09-24 VITALS — OXYGEN SATURATION: 97 %

## 2021-09-24 DIAGNOSIS — D12.5 BENIGN NEOPLASM OF SIGMOID COLON: ICD-10-CM

## 2021-09-24 DIAGNOSIS — K57.30 DIVERTICULOSIS OF LARGE INTESTINE WITHOUT PERFORATION OR ABS: ICD-10-CM

## 2021-09-24 DIAGNOSIS — K64.0: ICD-10-CM

## 2021-09-24 DIAGNOSIS — K21.9: ICD-10-CM

## 2021-09-24 DIAGNOSIS — K64.1 SECOND DEGREE HEMORRHOIDS: ICD-10-CM

## 2021-09-24 DIAGNOSIS — Z86.010 PERSONAL HISTORY OF COLONIC POLYPS: ICD-10-CM

## 2021-09-24 DIAGNOSIS — E78.5: ICD-10-CM

## 2021-09-24 DIAGNOSIS — I48.91: ICD-10-CM

## 2021-09-24 DIAGNOSIS — K63.5: ICD-10-CM

## 2021-09-24 DIAGNOSIS — Z12.11: Primary | ICD-10-CM

## 2021-09-24 DIAGNOSIS — J44.9: ICD-10-CM

## 2021-09-24 DIAGNOSIS — Z08 ENCOUNTER FOR FOLLOW-UP EXAMINATION AFTER COMPLETED TREATMEN: ICD-10-CM

## 2021-09-24 DIAGNOSIS — Z86.010: ICD-10-CM

## 2021-09-24 DIAGNOSIS — K57.32: ICD-10-CM

## 2021-09-24 DIAGNOSIS — I10: ICD-10-CM

## 2021-09-24 DIAGNOSIS — N40.0: ICD-10-CM

## 2021-09-24 DIAGNOSIS — D12.4 BENIGN NEOPLASM OF DESCENDING COLON: ICD-10-CM

## 2021-09-24 DIAGNOSIS — E11.9: ICD-10-CM

## 2021-09-24 LAB — GLUCOSE BLDC GLUCOMTR-MCNC: 110 MG/DL (ref 70–110)

## 2021-09-24 PROCEDURE — 82962 GLUCOSE BLOOD TEST: CPT

## 2021-09-24 PROCEDURE — 45385 COLONOSCOPY W/LESION REMOVAL: CPT

## 2021-09-24 PROCEDURE — 88305 TISSUE EXAM BY PATHOLOGIST: CPT

## 2021-09-24 PROCEDURE — 0DJD8ZZ INSPECTION OF LOWER INTESTINAL TRACT, VIA NATURAL OR ARTIFICIAL OPENING ENDOSCOPIC: ICD-10-PCS | Performed by: INTERNAL MEDICINE

## 2021-09-24 PROCEDURE — 45385 COLONOSCOPY W/LESION REMOVAL: CPT | Mod: PT | Performed by: INTERNAL MEDICINE

## 2021-12-01 ENCOUNTER — HOSPITAL ENCOUNTER (OUTPATIENT)
Age: 76
End: 2021-12-01
Payer: MEDICARE

## 2021-12-01 DIAGNOSIS — R63.5: ICD-10-CM

## 2021-12-01 DIAGNOSIS — I10: ICD-10-CM

## 2021-12-01 DIAGNOSIS — Z79.84: ICD-10-CM

## 2021-12-01 DIAGNOSIS — E11.9: Primary | ICD-10-CM

## 2021-12-01 LAB
ALBUMIN LEVEL: 4 G/DL (ref 3.5–5)
ALBUMIN/GLOB SERPL: 1.3 {RATIO} (ref 1.1–1.8)
ALP ISO SERPL-ACNC: 76 U/L (ref 38–126)
ALT SERPLBLD-CCNC: 20 U/L (ref 12–78)
ANION GAP SERPL CALC-SCNC: 10.2 MEQ/L (ref 5–15)
AST SERPL QL: 30 U/L (ref 17–59)
BILIRUBIN,TOTAL: 1 MG/DL (ref 0.2–1.3)
BUN SERPL-MCNC: 23 MG/DL (ref 9–20)
CALCIUM SPEC-MCNC: 9.2 MG/DL (ref 8.4–10.2)
CHLORIDE SPEC-SCNC: 98 MMOL/L (ref 98–107)
CHOLEST SPEC-SCNC: 152 MG/DL (ref 140–200)
CO2 SERPL-SCNC: 31 MMOL/L (ref 22–30)
CREAT BLD-SCNC: 1.4 MG/DL (ref 0.66–1.25)
ESTIMATED GLOMERULAR FILT RATE: 50 ML/MIN (ref 60–?)
GFR (AFRICAN AMERICAN): 60 ML/MIN (ref 60–?)
GLOBULIN SER CALC-MCNC: 3 G/DL (ref 1.3–3.2)
GLUCOSE: 123 MG/DL (ref 74–100)
HBA1C MFR BLD: 6.1 % (ref 4–6)
HDLC SERPL-MCNC: 33 MG/DL (ref 40–60)
POTASSIUM: 3.2 MMOL/L (ref 3.5–5.1)
PROT SERPL-MCNC: 7 G/DL (ref 6.3–8.2)
SODIUM SPEC-SCNC: 136 MMOL/L (ref 136–145)
TRIGLYCERIDES: 204 MG/DL (ref 30–150)
TSH SERPL-ACNC: 3 UIU/ML (ref 0.47–4.68)

## 2021-12-01 PROCEDURE — 83036 HEMOGLOBIN GLYCOSYLATED A1C: CPT

## 2021-12-01 PROCEDURE — 80053 COMPREHEN METABOLIC PANEL: CPT

## 2021-12-01 PROCEDURE — 84443 ASSAY THYROID STIM HORMONE: CPT

## 2021-12-01 PROCEDURE — 80061 LIPID PANEL: CPT

## 2022-04-19 ENCOUNTER — HOSPITAL ENCOUNTER (OUTPATIENT)
Age: 77
End: 2022-04-19
Payer: MEDICARE

## 2022-04-19 DIAGNOSIS — R60.0: Primary | ICD-10-CM

## 2022-04-19 PROCEDURE — 93306 TTE W/DOPPLER COMPLETE: CPT

## 2022-06-28 ENCOUNTER — AMBULATORY SURGICAL CENTER (OUTPATIENT)
Dept: URBAN - METROPOLITAN AREA SURGERY 10 | Facility: SURGERY | Age: 77
End: 2022-06-28
Payer: COMMERCIAL

## 2022-06-28 DIAGNOSIS — K80.50 CALCULUS OF BILE DUCT WITHOUT CHOLANGITIS OR CHOLECYSTITIS W: ICD-10-CM

## 2022-06-28 PROCEDURE — 43264 ERCP REMOVE DUCT CALCULI: CPT | Performed by: INTERNAL MEDICINE

## 2022-06-28 PROCEDURE — 43262 ENDO CHOLANGIOPANCREATOGRAPH: CPT | Mod: 59 | Performed by: INTERNAL MEDICINE

## 2022-08-04 ENCOUNTER — HOSPITAL ENCOUNTER (OUTPATIENT)
Age: 77
End: 2022-08-04
Payer: MEDICARE

## 2022-08-04 DIAGNOSIS — E11.29: Primary | ICD-10-CM

## 2022-08-04 DIAGNOSIS — N18.31: ICD-10-CM

## 2022-08-04 LAB
ANION GAP SERPL CALC-SCNC: 13.4 MEQ/L (ref 5–15)
BUN SERPL-MCNC: 26 MG/DL (ref 9–20)
CALCIUM SPEC-MCNC: 9.5 MG/DL (ref 8.4–10.2)
CHLORIDE SPEC-SCNC: 101 MMOL/L (ref 98–107)
CO2 SERPL-SCNC: 25 MMOL/L (ref 22–30)
CREAT BLD-SCNC: 1.1 MG/DL (ref 0.66–1.25)
ESTIMATED GLOMERULAR FILT RATE: 65 ML/MIN (ref 60–?)
GFR (AFRICAN AMERICAN): 79 ML/MIN (ref 60–?)
GLUCOSE: 172 MG/DL (ref 74–100)
HBA1C MFR BLD: 6.9 % (ref 4–6)
POTASSIUM: 3.4 MMOL/L (ref 3.5–5.1)
SODIUM SPEC-SCNC: 136 MMOL/L (ref 136–145)

## 2022-08-04 PROCEDURE — 80048 BASIC METABOLIC PNL TOTAL CA: CPT

## 2022-08-04 PROCEDURE — 83036 HEMOGLOBIN GLYCOSYLATED A1C: CPT

## 2022-08-04 PROCEDURE — 36415 COLL VENOUS BLD VENIPUNCTURE: CPT

## 2022-08-29 ENCOUNTER — HOSPITAL ENCOUNTER (OUTPATIENT)
Age: 77
End: 2022-08-29
Payer: MEDICARE

## 2022-08-29 DIAGNOSIS — Z12.5: Primary | ICD-10-CM

## 2022-08-29 PROCEDURE — G0103 PSA SCREENING: HCPCS

## 2022-08-29 PROCEDURE — 36415 COLL VENOUS BLD VENIPUNCTURE: CPT

## 2023-07-10 ENCOUNTER — HOSPITAL ENCOUNTER (OUTPATIENT)
Age: 78
End: 2023-07-10
Payer: MEDICARE

## 2023-07-10 DIAGNOSIS — M54.32: ICD-10-CM

## 2023-07-10 DIAGNOSIS — M54.50: Primary | ICD-10-CM

## 2023-07-10 PROCEDURE — 76376 3D RENDER W/INTRP POSTPROCES: CPT

## 2023-07-10 PROCEDURE — 72148 MRI LUMBAR SPINE W/O DYE: CPT

## 2023-09-14 ENCOUNTER — OFFICE VISIT (OUTPATIENT)
Dept: NEUROSURGERY | Facility: CLINIC | Age: 78
End: 2023-09-14
Payer: MEDICARE

## 2023-09-14 VITALS — HEIGHT: 74 IN | WEIGHT: 295 LBS | TEMPERATURE: 96.8 F | BODY MASS INDEX: 37.86 KG/M2

## 2023-09-14 DIAGNOSIS — M51.26 RUPTURED LUMBAR DISC: ICD-10-CM

## 2023-09-14 DIAGNOSIS — M51.36 DDD (DEGENERATIVE DISC DISEASE), LUMBAR: Primary | ICD-10-CM

## 2023-09-14 DIAGNOSIS — M48.062 SPINAL STENOSIS, LUMBAR REGION, WITH NEUROGENIC CLAUDICATION: ICD-10-CM

## 2023-09-14 PROCEDURE — 99203 OFFICE O/P NEW LOW 30 MIN: CPT | Performed by: NEUROLOGICAL SURGERY

## 2023-09-14 RX ORDER — PIOGLITAZONEHYDROCHLORIDE 15 MG/1
TABLET ORAL EVERY 24 HOURS
COMMUNITY

## 2023-09-14 RX ORDER — IBUPROFEN 200 MG
200 TABLET ORAL EVERY 6 HOURS PRN
COMMUNITY

## 2023-09-14 RX ORDER — POTASSIUM CHLORIDE 750 MG/1
CAPSULE, EXTENDED RELEASE ORAL
COMMUNITY

## 2023-09-14 RX ORDER — FUROSEMIDE 20 MG/1
TABLET ORAL
COMMUNITY

## 2023-09-14 NOTE — PROGRESS NOTES
NAME: HERNAN DENNIS   DOS: 2023  : 1945  PCP: Jerzy Da Silva MD    Chief Complaint:    Chief Complaint   Patient presents with    Low Back Pain & Left Leg Numbness       History of Present Illness:  78 y.o. male   Saw a 78-year-old male neurosurgical Consultation he lives in [facility] ([facility] and owns a furniture store.  He presents with a history of chronic on again off again back issues he states that he is had a history of multiple back issues he would take pain medicines for sometimes and then gradually will get better most of his pain chronically is around the right-sided buttock hip and leg its associate with claudicatory symptoms he is a past smoker    He also history of atrial fibrillation and maze procedure and chronic immobility    Most recently however he had a significant episode of back pain and left-sided 5 weakness its gotten better about 70% so he reports 0 pain when sitting    .3.00    PMHX  Allergies:  No Known Allergies  Medications    Current Outpatient Medications:     Budesonide (ENTOCORT EC PO), Take 3 mg by mouth Daily., Disp: , Rfl:     carvedilol (COREG) 25 MG tablet, Take 1 tablet by mouth 2 (Two) Times a Day With Meals., Disp: , Rfl:     DOXAZOSIN MESYLATE PO, Take 1 tablet by mouth Every Night., Disp: , Rfl:     finasteride (PROSCAR) 5 MG tablet, Take 1 tablet by mouth Daily., Disp: , Rfl:     furosemide (LASIX) 20 MG tablet, 1 tablet orally once a day as needed for swelling for 90 days, Disp: , Rfl:     ibuprofen (ADVIL,MOTRIN) 200 MG tablet, Take 1 tablet by mouth Every 6 (Six) Hours As Needed for Mild Pain., Disp: , Rfl:     losartan-hydrochlorothiazide (HYZAAR) 50-12.5 MG per tablet, Take 1 tablet by mouth Daily., Disp: , Rfl:     metFORMIN (GLUCOPHAGE) 1000 MG tablet, Take 1 tablet by mouth 2 (Two) Times a Day With Meals., Disp: , Rfl:     omeprazole (priLOSEC) 20 MG capsule, Take 1 capsule by mouth., Disp: , Rfl:     pioglitazone (ACTOS) 15 MG tablet, Daily.,  Disp: , Rfl:     potassium chloride (MICRO-K) 10 MEQ CR capsule, 1 cap(s) orally once a day with lasix for 90 days, Disp: , Rfl:     verapamil SR (CALAN-SR) 180 MG CR tablet, Take 2 tablets by mouth Daily., Disp: , Rfl:   Past Medical History:  Past Medical History:   Diagnosis Date    Diabetes mellitus     GERD (gastroesophageal reflux disease)     HTN (hypertension)     Lumbosacral disc disease      Past Surgical History:  Past Surgical History:   Procedure Laterality Date    CHOLECYSTECTOMY       Social Hx:  Social History     Tobacco Use    Smoking status: Former     Packs/day: 2.00     Years: 15.00     Pack years: 30.00     Types: Cigarettes     Quit date:      Years since quittin.7    Tobacco comments:     quit 30 years ago   Vaping Use    Vaping Use: Never used   Substance Use Topics    Alcohol use: Yes     Comment: social    Drug use: No     Family Hx:  Family History   Problem Relation Age of Onset    Heart attack Mother     Hypertension Mother     Heart attack Father      Review of Systems:        Review of Systems   Constitutional:  Positive for fatigue. Negative for activity change, appetite change, chills, diaphoresis, fever and unexpected weight change.   HENT:  Negative for congestion, dental problem, drooling, ear discharge, ear pain, facial swelling, hearing loss, mouth sores, nosebleeds, postnasal drip, rhinorrhea, sinus pressure, sinus pain, sneezing, sore throat, tinnitus, trouble swallowing and voice change.    Eyes:  Negative for photophobia, pain, discharge, redness, itching and visual disturbance.   Respiratory:  Negative for apnea, cough, choking, chest tightness, shortness of breath, wheezing and stridor.    Cardiovascular:  Positive for leg swelling. Negative for chest pain and palpitations.   Gastrointestinal:  Negative for abdominal distention, abdominal pain, anal bleeding, blood in stool, constipation, diarrhea, nausea, rectal pain and vomiting.   Endocrine: Positive for cold  intolerance. Negative for heat intolerance, polydipsia, polyphagia and polyuria.   Genitourinary:  Positive for frequency. Negative for decreased urine volume, difficulty urinating, dysuria, enuresis, flank pain, genital sores, hematuria, penile discharge, penile pain, penile swelling, scrotal swelling, testicular pain and urgency.   Musculoskeletal:  Positive for back pain, neck pain and neck stiffness. Negative for arthralgias, gait problem, joint swelling and myalgias.   Skin:  Negative for color change, pallor, rash and wound.   Allergic/Immunologic: Negative for environmental allergies, food allergies and immunocompromised state.   Neurological:  Positive for weakness and numbness. Negative for dizziness, tremors, seizures, syncope, facial asymmetry, speech difficulty, light-headedness and headaches.   Hematological:  Negative for adenopathy. Does not bruise/bleed easily.   Psychiatric/Behavioral:  Negative for agitation, behavioral problems, confusion, decreased concentration, dysphoric mood, hallucinations, self-injury, sleep disturbance and suicidal ideas. The patient is not nervous/anxious and is not hyperactive.           Physical Examination:  Vitals:    09/14/23 0959   Temp: 96.8 °F (36 °C)      General Appearance:   Well developed, well nourished, well groomed, alert, and cooperative.  Neurological examination:  Neurologic Exam  Vital signs were reviewed and documented in the chart  Patient appeared in good neurologic function with normal comprehension fluent speech  Mood and affect are normal  Sense of smell deferred  Truncal obesity noted  Muscle bulk and tone normal  5 out of 5 strength no motor drift  Gait normal intact walks with a walker  Positive Romberg decreased vibratory sensation bilaterally  No clonus long tract signs or myelopathy    Reflexes symmetric  Marked pitting edema noted and extremities skin appears normal for age with some evidence of venous stasis    Straight leg raise sign  absent  No signs of intrinsic hip dysfunction  Back is without any lesions or abnormality  Feet are warm and well perfused        Review of Imaging/DATA:  I personally reviewed and interpreted MRI of the lumbar spine is quite impressive he has a chronic degenerative spinal listhesis at L5-S1 with retrolisthesis 4 and 5 bilateral high-grade intraforaminal disease worse on the right this probably explains his chronic right-sided leg pain    Centrally he is got high-grade central stenosis with a left eccentric superiorly extruded disc at the 3 4 area with central canal stenosis at L1-L2 3 and L3-4 he has a flat back  Diagnoses/Plan:    Mr. Remy is a 78 y.o. male   1.  Diffuse advanced lumbar spondylosis    2.  Obesity    3.  Diabetes atrial fibrillation deconditioning marked edema    4.  No pain at rest    5.  Generalized mobility issue    6.  Recent left-sided L3-4 disc herniation superior migration overall improved left leg pain and numbness    7.  Right-sided L5-S1 chronic spinal listhesis with right-sided sciatic leg pain    8.  Central stenosis L2-3 L3-4    I explained the risk benefits and expected outcome of major elective surgery for their problem, complications from approach, and infection, the risk of neurologic implications after surgery as well as need for repeat surgeries and most importantly failure to achieve quality of life improvement from the surgery to the patient.  I explained the complex nature of his lumbar spine he has a mixture of acute on chronic issues I think a reasonable plan should his symptoms become recalcitrant would be a multilevel laminectomy at L2-3 would displace exploration    As far as his right leg and hip pain that would be more extensive we could contemplate transforaminal decompression but its associate with higher risk explained the need for for multiple surgeries    Plan from a neurosurgical standpoint I counseled him on deconditioning, weight loss, diabetic control    He  needs to follow-up with his primary care doctor regarding his edema    He is in a follow-up with me in 90 days with lumbar flexion-extension films if he is not better and wishes to proceed with surgery I think a first bite sized chunk of his surgery would include an L2-3 laminectomy with foraminotomies

## 2023-09-14 NOTE — PROGRESS NOTES
Patient: Chidi Remy  : 1945    Primary Care Provider: Jerzy Da Silva MD    Requesting Provider: As above        History    Chief Complaint: ***    History of Present Illness: ***    Review of Systems   Constitutional:  Positive for activity change and fatigue. Negative for appetite change, chills, diaphoresis, fever and unexpected weight change.   HENT:  Negative for congestion, dental problem, drooling, ear discharge, ear pain, facial swelling, hearing loss, mouth sores, nosebleeds, postnasal drip, rhinorrhea, sinus pressure, sinus pain, sneezing, sore throat, tinnitus, trouble swallowing and voice change.    Eyes:  Negative for photophobia, pain, discharge, redness, itching and visual disturbance.   Respiratory:  Positive for shortness of breath. Negative for apnea, cough, choking, chest tightness, wheezing and stridor.    Cardiovascular:  Positive for leg swelling. Negative for chest pain and palpitations.   Gastrointestinal:  Negative for abdominal distention, abdominal pain, anal bleeding, blood in stool, constipation, diarrhea, nausea, rectal pain and vomiting.   Endocrine: Positive for cold intolerance. Negative for heat intolerance, polydipsia, polyphagia and polyuria.   Genitourinary:  Positive for frequency. Negative for decreased urine volume, difficulty urinating, dysuria, enuresis, flank pain, genital sores, hematuria, penile discharge, penile pain, penile swelling, scrotal swelling, testicular pain and urgency.   Musculoskeletal:  Positive for back pain, neck pain and neck stiffness. Negative for arthralgias, gait problem, joint swelling and myalgias.   Skin:  Negative for color change, pallor, rash and wound.   Allergic/Immunologic: Negative for environmental allergies, food allergies and immunocompromised state.   Neurological:  Positive for numbness. Negative for dizziness, tremors, seizures, syncope, facial asymmetry, speech difficulty, weakness, light-headedness and headaches.  "  Hematological:  Negative for adenopathy. Does not bruise/bleed easily.   Psychiatric/Behavioral:  Positive for sleep disturbance. Negative for agitation, behavioral problems, confusion, decreased concentration, dysphoric mood, hallucinations, self-injury and suicidal ideas. The patient is not nervous/anxious and is not hyperactive.      The patient's past medical history, past surgical history, family history, and social history have been reviewed at length in the electronic medical record.      Physical Exam:   Temp 96.8 °F (36 °C) (Infrared)   Ht 188 cm (74\")   Wt 134 kg (295 lb)   BMI 37.88 kg/m²   ***    Medical Decision Making    Data Review:   (All imaging studies were personally reviewed unless stated otherwise)  ***    Diagnosis:   ***    Treatment Options:   ***      No diagnosis found.        I, Dr. Schneider, personally performed the services described in the documentation, as scribed in my presence, and it is both accurate and complete.  "

## 2023-11-09 ENCOUNTER — HOSPITAL ENCOUNTER (OUTPATIENT)
Dept: HOSPITAL 22 - PT | Age: 78
LOS: 18 days | Discharge: HOME | End: 2023-11-27
Payer: MEDICARE

## 2023-11-09 DIAGNOSIS — M51.26: ICD-10-CM

## 2023-11-09 DIAGNOSIS — M48.062: ICD-10-CM

## 2023-11-09 DIAGNOSIS — M51.36: Primary | ICD-10-CM

## 2023-11-09 PROCEDURE — 97164 PT RE-EVAL EST PLAN CARE: CPT

## 2023-11-09 PROCEDURE — 97012 MECHANICAL TRACTION THERAPY: CPT

## 2023-11-09 PROCEDURE — 97140 MANUAL THERAPY 1/> REGIONS: CPT

## 2023-11-09 PROCEDURE — 97530 THERAPEUTIC ACTIVITIES: CPT

## 2023-11-09 PROCEDURE — 97110 THERAPEUTIC EXERCISES: CPT

## 2023-11-09 PROCEDURE — 97163 PT EVAL HIGH COMPLEX 45 MIN: CPT

## 2023-12-12 ENCOUNTER — HOSPITAL ENCOUNTER (OUTPATIENT)
Dept: GENERAL RADIOLOGY | Facility: HOSPITAL | Age: 78
Discharge: HOME | End: 2023-12-12
Admitting: NEUROLOGICAL SURGERY
Payer: MEDICARE

## 2023-12-12 DIAGNOSIS — M51.36 DDD (DEGENERATIVE DISC DISEASE), LUMBAR: ICD-10-CM

## 2023-12-12 DIAGNOSIS — M48.062 SPINAL STENOSIS, LUMBAR REGION, WITH NEUROGENIC CLAUDICATION: ICD-10-CM

## 2023-12-12 DIAGNOSIS — M51.26 RUPTURED LUMBAR DISC: ICD-10-CM

## 2023-12-12 PROCEDURE — 72120 X-RAY BEND ONLY L-S SPINE: CPT

## 2023-12-14 ENCOUNTER — OFFICE VISIT (OUTPATIENT)
Dept: NEUROSURGERY | Facility: CLINIC | Age: 78
End: 2023-12-14
Payer: MEDICARE

## 2023-12-14 VITALS — RESPIRATION RATE: 17 BRPM | WEIGHT: 285.4 LBS | HEIGHT: 74 IN | BODY MASS INDEX: 36.63 KG/M2

## 2023-12-14 DIAGNOSIS — M48.062 SPINAL STENOSIS, LUMBAR REGION, WITH NEUROGENIC CLAUDICATION: Primary | ICD-10-CM

## 2023-12-14 DIAGNOSIS — M51.36 DDD (DEGENERATIVE DISC DISEASE), LUMBAR: ICD-10-CM

## 2023-12-14 PROCEDURE — 99214 OFFICE O/P EST MOD 30 MIN: CPT | Performed by: NEUROLOGICAL SURGERY

## 2023-12-14 NOTE — PROGRESS NOTES
Patient: Chidi Remy  : 1945    Primary Care Provider: Jerzy Da Silva MD    Requesting Provider: As above        History    Chief Complaint: Low back pain    History of Present Illness:   This is a gentleman well-known to me for history of severe spinal stenosis who presented on a walker with right-sided leg pain.  He had significant low back pain symptoms of claudication he underwent lumbar flexion-extension films and physical therapy he is here for follow-up    He has a history of generalized mobility issues truncal obesity as well as history of diabetes    He reports marked improvement in symptoms denies any cauda equina syndrome and is here for follow-up    Review of Systems   Constitutional:  Negative for activity change, appetite change, chills, diaphoresis, fatigue, fever and unexpected weight change.   HENT:  Negative for congestion, dental problem, drooling, ear discharge, ear pain, facial swelling, hearing loss, mouth sores, nosebleeds, postnasal drip, rhinorrhea, sinus pressure, sneezing, sore throat, tinnitus, trouble swallowing and voice change.    Eyes:  Negative for photophobia, pain, discharge, redness, itching and visual disturbance.   Respiratory:  Negative for apnea, cough, choking, chest tightness, shortness of breath, wheezing and stridor.    Cardiovascular:  Negative for chest pain, palpitations and leg swelling.   Gastrointestinal:  Negative for abdominal distention, abdominal pain, anal bleeding, blood in stool, constipation, diarrhea, nausea, rectal pain and vomiting.   Endocrine: Negative for cold intolerance, heat intolerance, polydipsia, polyphagia and polyuria.   Genitourinary:  Negative for decreased urine volume, difficulty urinating, dysuria, enuresis, flank pain, frequency, genital sores, hematuria and urgency.   Musculoskeletal:  Positive for back pain. Negative for arthralgias, gait problem, joint swelling, myalgias, neck pain and neck stiffness.   Skin:   "Negative for color change, pallor, rash and wound.   Allergic/Immunologic: Negative for environmental allergies, food allergies and immunocompromised state.   Neurological:  Negative for dizziness, tremors, seizures, syncope, facial asymmetry, speech difficulty, weakness, light-headedness, numbness and headaches.   Hematological:  Negative for adenopathy. Does not bruise/bleed easily.   Psychiatric/Behavioral:  Negative for agitation, behavioral problems, confusion, decreased concentration, dysphoric mood, hallucinations, self-injury, sleep disturbance and suicidal ideas. The patient is not nervous/anxious and is not hyperactive.    All other systems reviewed and are negative.      The patient's past medical history, past surgical history, family history, and social history have been reviewed at length in the electronic medical record.      Physical Exam:   Resp 17   Ht 188 cm (74\")   Wt 129 kg (285 lb 6.4 oz)   BMI 36.64 kg/m²     He is wide awake alert follows commands    His backs without lesions    Gait and station is normal he can push up on his tiptoes    He has pitting edema in his lower extremities that is rather severe but stable    And he denies any weakness overall today he is walking without any assistance he has good gait and station and balance with symptoms of peripheral neuropathy and no myelopathic findings    Medical Decision Making    Data Review:   (All imaging studies were personally reviewed unless stated otherwise)  His MRI is quite impressive he has high-grade central stenosis a grade 1 spondylolisthesis of L5-S1 interpreted the films personally, he additionally has high-grade stenosis of the L2-3 area as well        Diagnosis:   1.  Suspected synovial cyst formation and/or disc herniation L4-5 or perhaps L2-3 with chronic underlying significant spinal stenosis    2.  Quite impressive clinical improvement despite very impressive radiographic picture    Treatment Options:   I explained " explicitly the signs and symptoms to look for to necessitate a referral back    From my standpoint we talked about surgical options he is not really interested in that I did explain that the degree of stenosis radiographically is quite severe however given his clinical improvement his mobility issues and higher risk for surgery and advanced age the downtime etc. it is not unreasonable to monitor him    I suspect he will require laminectomy at some point but if we can get an effort to get some of his body weight off continue with PT and strengthening we may be able to improve his mobility with nonsurgical means    I do have a low threshold to proceed with surgery and we have given him direct lines to the office to get in touch with us should the symptoms get worse or his gait and mobility issues get worse    From a technical standpoint I think simple laminectomies will be the best option probably at 2 3 and 4 with bilateral foraminotomies at 4 5 but I did also explain there is a risk of instability and he would require more surgery in the downtime associated with that instrumented fusions etc. may be excessive    He is on call us if he gets worse he is getting continue with PT and we will go from there      I, Dr. Schneider, personally performed the services described in the documentation, as scribed in my presence, and it is both accurate and complete.

## 2023-12-27 ENCOUNTER — TELEPHONE (OUTPATIENT)
Dept: NEUROSURGERY | Facility: CLINIC | Age: 78
End: 2023-12-27
Payer: MEDICARE

## 2023-12-27 DIAGNOSIS — M48.062 SPINAL STENOSIS, LUMBAR REGION, WITH NEUROGENIC CLAUDICATION: Primary | ICD-10-CM

## 2023-12-27 NOTE — TELEPHONE ENCOUNTER
Provider: Willi    Surgery/Procedure: -   Surgery/Procedure Date: -   Last visit:  Office Visit with Geoffrey Márquez MD (12/14/2023)    Next visit: Appointment with Cas Chou PA-C (06/17/2024)      Reason for call: Pt's wife LVM requesting another referral for PT as this has helped pt in the past.     New PT order has been pended. Please sign if appropriate.

## 2024-03-04 ENCOUNTER — HOSPITAL ENCOUNTER (OUTPATIENT)
Dept: HOSPITAL 22 - PT | Age: 79
LOS: 14 days | Discharge: HOME | End: 2024-03-18
Payer: MEDICARE

## 2024-03-04 DIAGNOSIS — M48.062: Primary | ICD-10-CM

## 2024-03-04 PROCEDURE — 97163 PT EVAL HIGH COMPLEX 45 MIN: CPT

## 2024-03-04 PROCEDURE — 97012 MECHANICAL TRACTION THERAPY: CPT

## 2024-03-04 PROCEDURE — 97530 THERAPEUTIC ACTIVITIES: CPT

## 2024-03-04 PROCEDURE — 97140 MANUAL THERAPY 1/> REGIONS: CPT

## 2024-03-04 PROCEDURE — 97110 THERAPEUTIC EXERCISES: CPT

## 2024-03-04 PROCEDURE — 97164 PT RE-EVAL EST PLAN CARE: CPT

## (undated) DEVICE — BOWL UTIL STRL 32OZ

## (undated) DEVICE — GW JAG STR .035IN 450CM

## (undated) DEVICE — TRIPLE LUMEN ERCP CANNULA: Brand: TANDEM XL